# Patient Record
Sex: MALE | Race: OTHER | HISPANIC OR LATINO | URBAN - METROPOLITAN AREA
[De-identification: names, ages, dates, MRNs, and addresses within clinical notes are randomized per-mention and may not be internally consistent; named-entity substitution may affect disease eponyms.]

---

## 2021-10-02 ENCOUNTER — EMERGENCY (EMERGENCY)
Facility: HOSPITAL | Age: 61
LOS: 1 days | Discharge: HOME | End: 2021-10-02
Attending: EMERGENCY MEDICINE
Payer: MEDICAID

## 2021-10-02 VITALS
SYSTOLIC BLOOD PRESSURE: 119 MMHG | HEIGHT: 73 IN | RESPIRATION RATE: 18 BRPM | OXYGEN SATURATION: 98 % | HEART RATE: 70 BPM | DIASTOLIC BLOOD PRESSURE: 80 MMHG | TEMPERATURE: 98 F | WEIGHT: 210.1 LBS

## 2021-10-02 PROCEDURE — 99282 EMERGENCY DEPT VISIT SF MDM: CPT

## 2021-10-02 PROCEDURE — 99283 EMERGENCY DEPT VISIT LOW MDM: CPT

## 2021-10-02 RX ORDER — OXYMETAZOLINE HYDROCHLORIDE 0.5 MG/ML
1 SPRAY NASAL ONCE
Refills: 0 | Status: DISCONTINUED | OUTPATIENT
Start: 2021-10-02 | End: 2021-10-07

## 2021-10-02 NOTE — ED STATDOCS - CLINICAL SUMMARY MEDICAL DECISION MAKING FREE TEXT BOX
Pt with nose bleed that has stopped, pt has some clots, will have pt blow out nose, will give afrin, discussed that can raise blood pressure , currently hemodynamically stable, no need for blood, will refer to outpatient ENT. Pt with nose bleed that has stopped, pt has some clots, will have pt blow out nose, will give afrin, discussed that can raise blood pressure , currently hemodynamically stable, will refer to outpatient ENT.

## 2021-10-02 NOTE — ED STATDOCS - PHYSICAL EXAMINATION
Head: atraumatic, normacephalic  Face: atraumatic, no crepitus no orbiral/maxillary/mandibular ttp, clots and dried trickling blood to left nostril, clear right nostril, no swelling, no signs of trauma  throat: uvula midline no exudates  eyes: perrla eomi  heart: rrr s1s2  lungs: ctab  abd: soft, nt nd +bs no rebound/guarding no cva ttp  skin: warm  LE: no swelling, no calf ttp  back: no midline cervical/thoracic/lumbar ttp

## 2021-10-02 NOTE — ED STATDOCS - PROGRESS NOTE DETAILS
pt no longer bleeding at this time; blew out all the clots no active bleeding BP stable no dizziness; no need for afrin at this time explained to pt that needs to be very cautious with afrin as can raise his BP - both pt and wife understand will dc with ENT follow up

## 2021-10-02 NOTE — ED ADULT TRIAGE NOTE - CHIEF COMPLAINT QUOTE
Pt states he was driving when he suddenly started having nose bleed.  Pt denies any anticoags. Pt states he was driving when he suddenly started having nose bleed.  Pt denies any anticoags.  Bleeding stopped at  this time.

## 2021-10-02 NOTE — ED STATDOCS - NSFOLLOWUPINSTRUCTIONS_ED_ALL_ED_FT
return to the ED if nose bleed again and cant stop the bleeding or start to feel lightheaded nausea chest pain. follow up with ENT within 1 week

## 2021-10-02 NOTE — ED STATDOCS - PATIENT PORTAL LINK FT
You can access the FollowMyHealth Patient Portal offered by [facility] by registering at the following website: [email or web address]. By joining [employer]’s FollowMyHealth portal, you will also be able to view your health information using other applications (apps) compatible with our system.

## 2021-10-02 NOTE — ED STATDOCS - OBJECTIVE STATEMENT
60 y/o male with PMHx of stroke, MI, HTN, HLD p/w nose bleed. Pt was driving and around 1:00 and his left nostril started bleeding, pt applied pressure and the bleeding last around an hour and a half. Pt denies trauma to area. Pt had hx of epistaxis 7 years ago that was cauterized. Pt is on asprin, not on any coagulation.     Denies f/c/n/v/cp/sob/palpitations/cough/rash/headache/dizziness/abd.pain/d/c/dysuria/hematuria

## 2021-10-02 NOTE — ED STATDOCS - CARE PROVIDER_API CALL
Marlon Chandra)  Otolaryngology  [address] [place] [address], [place] [address]  Phone: [phone]  Fax: [phone]  Follow Up Time:

## 2023-09-06 ENCOUNTER — APPOINTMENT (OUTPATIENT)
Dept: ORTHOPEDIC SURGERY | Facility: CLINIC | Age: 63
End: 2023-09-06

## 2023-09-11 ENCOUNTER — APPOINTMENT (OUTPATIENT)
Dept: ORTHOPEDIC SURGERY | Facility: CLINIC | Age: 63
End: 2023-09-11
Payer: MEDICAID

## 2023-09-11 PROCEDURE — 99204 OFFICE O/P NEW MOD 45 MIN: CPT

## 2023-09-11 RX ORDER — NAPROXEN 500 MG/1
500 TABLET ORAL
Qty: 60 | Refills: 0 | Status: ACTIVE | COMMUNITY
Start: 2023-09-11 | End: 1900-01-01

## 2023-09-15 ENCOUNTER — RESULT REVIEW (OUTPATIENT)
Age: 63
End: 2023-09-15

## 2023-09-21 ENCOUNTER — APPOINTMENT (OUTPATIENT)
Dept: ORTHOPEDIC SURGERY | Facility: CLINIC | Age: 63
End: 2023-09-21
Payer: MEDICAID

## 2023-09-21 PROCEDURE — 99214 OFFICE O/P EST MOD 30 MIN: CPT | Mod: 25

## 2023-09-21 PROCEDURE — 20610 DRAIN/INJ JOINT/BURSA W/O US: CPT | Mod: LT

## 2023-09-21 RX ORDER — METHYLPREDNISOLONE 4 MG/1
4 TABLET ORAL
Qty: 1 | Refills: 0 | Status: ACTIVE | COMMUNITY
Start: 2023-09-21 | End: 1900-01-01

## 2023-12-21 ENCOUNTER — APPOINTMENT (OUTPATIENT)
Dept: ORTHOPEDIC SURGERY | Facility: CLINIC | Age: 63
End: 2023-12-21
Payer: COMMERCIAL

## 2023-12-21 DIAGNOSIS — M75.102 UNSPECIFIED ROTATOR CUFF TEAR OR RUPTURE OF LEFT SHOULDER, NOT SPECIFIED AS TRAUMATIC: ICD-10-CM

## 2023-12-21 DIAGNOSIS — M75.42 IMPINGEMENT SYNDROME OF LEFT SHOULDER: ICD-10-CM

## 2023-12-21 DIAGNOSIS — M75.02 ADHESIVE CAPSULITIS OF LEFT SHOULDER: ICD-10-CM

## 2023-12-21 PROCEDURE — 99214 OFFICE O/P EST MOD 30 MIN: CPT

## 2023-12-21 NOTE — DISCUSSION/SUMMARY
[de-identified] : Assessment:   The patient presents with history, examination and imaging that are most consistent with a diagnosis of:  LT rotator cuff syndrome and adhesive capsulitis   ***The patient would like to pursue conservative measures at this time. After consideration of various non-operative treatment modalities, the patient would like to proceed with the modalities listed below.   1. Pt. doing very well 2. NSAIDS, ice/heat, continue HEP 3. Discussed role of repeat CSI. 4) He will follow up prn.   Prior to appointment and during encounter with patient extensive medical records were reviewed including but not limited to, hospital records, out patient records, imaging results, and lab data. During this appointment the patient was examined, diagnoses were discussed and explained in a face to face manner. In addition extensive time was spent reviewing aforementioned diagnostic studies. Counseling including abnormal image results, differential diagnoses, treatment options, risk and benefits, lifestyle changes, current condition, and current medications was performed. Patient's comments, questions, and concerns were address and patient verbalized understanding.  ---------------------------     Plan:    Counseling:     - Patient recommended to modify their activities until symptoms resolve.    NSAIDs:     - Medication script sent to the patients preferred pharmacy.    - Patient was instructed to take this medication with food on an as needed basis.    - Patient educated on the gastrointestinal side effects with excessive use.   The patient's current medication management of their orthopedic diagnosis was reviewed today:   (1) We discussed a comprehensive treatment plan that included possible pharmaceutical management involving the use of prescription strength medications including but not limited to options such as oral Naprosyn 500mg BID, once daily Meloxicam 15 mg, or 500-650 mg Tylenol versus over the counter oral medications and topical prescription NSAID Pennsaid vs over the counter Voltaren gel.   (2) There is a moderate risk of morbidity with further treatment, especially from use of prescription strength medications and possible side effects of these medications which include upset stomach with oral medications, skin reactions to topical medications and cardiac/renal issues with long term use.   (3) I recommended that the patient follow-up with their medical physician to discuss any significant specific potential issues with long term medication use such as interactions with current medications or with exacerbation of underlying medical comorbidities.   (4) The benefits and risks associated with use of injectable, oral or topical, prescription and over the counter anti-inflammatory medications were discussed with the patient. The patient voiced understanding of the risks including but not limited to bleeding, stroke, kidney dysfunction, heart disease, and were referred to the black box warning label for further information.

## 2023-12-21 NOTE — HISTORY OF PRESENT ILLNESS
[7] : 7 [0] : 0 [de-identified] : 12/21/23: f/u LT shoulder. He is feeling much better. Steroid injection last visit and MDP very helpful. Has transitioned to HEP. Some stiffness/soreness in the morning but otherwise no issues.   9/21/23: THONG is here today to review left shoulder MRI test results.  [FreeTextEntry1] : Left shoulder  [FreeTextEntry6] : Discomfort  [de-identified] : Physical therapy.

## 2023-12-21 NOTE — DATA REVIEWED
[FreeTextEntry1] : Monica 9/15/23 LT shoulder:  Tendinosis of the supraspinatus, infraspinatus, and subscapularis tendons. No evidence of full-thickness rotator cuff tendon tears.  Nondisplaced tear of the posterior superior labrum.  Thickening of the joint capsule at the level of the axillary recess.

## 2023-12-21 NOTE — PHYSICAL EXAM
[de-identified] : The patient is a well appearing 63 year year old male of their stated age. Neck is supple & nontender to palpation. Negative Spurling's test.   General: in no acute distress, seated comfortably, moving easily Skin: No discoloration, rashes; on palpation skin is dry, Neuro: Normal sensation all dermatomes, motor all myotomes Vascular: Normal pulses, no edema, normal temperature Coordination and balance: Normal Psych: normal mood and affect, non pressured speech, alert and oriented x3   LEFT Shoulder Inspection: Scapula Winging: Negative Deformity: None Erythema: None Ecchymosis: None Abrasions: None Effusion: None   Range of Motion: Active Forward Flexion: 160 degrees Passive Forward Flexion: 170 degrees Active IR : L4 Passive ER : 30 degrees   Motor Exam: Forward Flexion: 4+ out of 5 Flexion Plane of Scapula: 5 out of 5 Abduction: 4+ out of 5 Internal Rotation: 5 out of 5 External Rotation: 4+ out of 5 Distal Motor Strength: 5 out of 5   Stability Testing: Anterior: 1+ Posterior: 1+ Sulcus N: 1+ Sulcus ER: 1+   Provocative Tests: Drop Arm: Negative Sargent/Impingement: Positive Simpson: Positive X-Arm Adduction: Negative Belly Press: Negative Bear Hug: Negative Lift Off: Negative Apprehension: Negative Relocation: Negative Posterior Load & Shift: Negative   Palpation: AC Joint: Nontender Clavicle: Nontender SC Joint: Nontender Bicipital Groove: Positive Coracoid Process: Nontender Pectoralis Minor Tendon: Nontender Pectoralis Major Tendon: Nontender & palpably intact Latissimus Dorsi: Nontender Proximal Humerus: Positive Scapula Body: Nontender Medial Scapula Boarder: Nontender Scapula Spine: Nontender   Neurologic Exam: Sensation to Light Touch: Axillary: Grossly intact Ulnar: Grossly intact Radial: Grossly intact Median: Grossly intact Other:  N/A   Circulatory/Pulses: Ulnar: 2+ Radial: 2+ Other Pertinent Findings: None   RIGHT Shoulder Range of Motion: Active Forward Flexion: 180 degrees Active Abduction: 180 degrees Passive Forward Flexion: 180 degrees Passive Abduction: 180 degrees ER @ 90 degrees: 90 degrees IR @ 90 degrees: 45 degrees ER @ 0 degrees: 50 degrees   Motor Exam: Forward Flexion: 5 out of 5 Flexion Plane of Scapula: 5 out of 5 Abduction: 5 out of 5 Internal Rotation: 5 out of 5 External Rotation: 5 out of 5 Distal Motor Strength: 5 out of 5   Stability Testing: Anterior: 1+ Posterior: 1+ Sulcus N: 1+ Sulcus ER: 1+   Other Pertinent Findings: None  [Left] : left shoulder [Standing] : standing [] : motor and sensory intact distally

## 2024-07-10 ENCOUNTER — APPOINTMENT (OUTPATIENT)
Dept: ORTHOPEDIC SURGERY | Facility: CLINIC | Age: 64
End: 2024-07-10

## 2024-07-10 VITALS — BODY MASS INDEX: 27.3 KG/M2 | HEIGHT: 73 IN | WEIGHT: 206 LBS

## 2024-07-10 DIAGNOSIS — M75.02 ADHESIVE CAPSULITIS OF LEFT SHOULDER: ICD-10-CM

## 2024-07-10 DIAGNOSIS — M75.42 IMPINGEMENT SYNDROME OF LEFT SHOULDER: ICD-10-CM

## 2024-07-10 DIAGNOSIS — M75.102 UNSPECIFIED ROTATOR CUFF TEAR OR RUPTURE OF LEFT SHOULDER, NOT SPECIFIED AS TRAUMATIC: ICD-10-CM

## 2024-07-10 PROCEDURE — 20610 DRAIN/INJ JOINT/BURSA W/O US: CPT | Mod: LT

## 2024-07-10 PROCEDURE — J3490M: CUSTOM

## 2024-07-10 PROCEDURE — 99214 OFFICE O/P EST MOD 30 MIN: CPT | Mod: 25
